# Patient Record
Sex: FEMALE | Race: WHITE | Employment: OTHER | URBAN - METROPOLITAN AREA
[De-identification: names, ages, dates, MRNs, and addresses within clinical notes are randomized per-mention and may not be internally consistent; named-entity substitution may affect disease eponyms.]

---

## 2024-02-18 ENCOUNTER — APPOINTMENT (OUTPATIENT)
Dept: GENERAL RADIOLOGY | Age: 89
End: 2024-02-18
Attending: PHYSICIAN ASSISTANT
Payer: MEDICARE

## 2024-02-18 ENCOUNTER — HOSPITAL ENCOUNTER (OUTPATIENT)
Age: 89
Discharge: HOME | End: 2024-02-18
Payer: MEDICARE

## 2024-02-18 VITALS
RESPIRATION RATE: 18 BRPM | TEMPERATURE: 100 F | HEART RATE: 77 BPM | DIASTOLIC BLOOD PRESSURE: 54 MMHG | SYSTOLIC BLOOD PRESSURE: 119 MMHG | OXYGEN SATURATION: 96 %

## 2024-02-18 DIAGNOSIS — S22.42XA CLOSED FRACTURE OF MULTIPLE RIBS OF LEFT SIDE, INITIAL ENCOUNTER: Primary | ICD-10-CM

## 2024-02-18 DIAGNOSIS — T14.90XA BLUNT TRAUMA: ICD-10-CM
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
## 2024-02-18 PROCEDURE — 71101 X-RAY EXAM UNILAT RIBS/CHEST: CPT | Performed by: PHYSICIAN ASSISTANT

## 2024-02-18 NOTE — ED PROVIDER NOTES
Patient Seen in: [facility]      History     Chief Complaint   Patient presents with    Fall     Stated Complaint: LT SIDE INJURY WITH PAIN    Subjective:   HPI    Patient is a 89-year-old female, presenting to immediate care for evaluation of acute left rib pain with associated bruising status post blunt trauma from mechanical fall 3 days ago.  Patient getting out of bed.  States tripped while walking of slippers and hit the corner of a desk onto left rib.  Since has been experiencing left rib pain with associated bruising.  Tender to palpation and movement.  Some relief with rest and ibuprofen.  She denies any other injuries.  No head injury or LOC.  No chest pain or shortness of breath.  No nausea or vomiting.  No back or flank or abdominal pain.  No hematuria.  No gait dysfunction.  Here for x-ray imaging for evaluation of rib fracture.  No other acute concerns    Objective:   History reviewed. No pertinent past medical history.           No pertinent past surgical history.              No pertinent social history.            Review of Systems   Constitutional:  Positive for activity change. Negative for fever.   Respiratory:  Negative for shortness of breath.    Cardiovascular:  Negative for chest pain.   Gastrointestinal:  Negative for abdominal pain, blood in stool, nausea and vomiting.   Genitourinary:  Negative for flank pain and hematuria.   Musculoskeletal:  Negative for back pain.        Left rib pain   Skin:         Left rib bruising   Psychiatric/Behavioral:  Negative for confusion.    All other systems reviewed and are negative.      Positive for stated complaint: LT SIDE INJURY WITH PAIN  Other systems are as noted in HPI.  Constitutional and vital signs reviewed.      All other systems reviewed and negative except as noted above.    Physical Exam     ED Triage Vitals [02/18/24 1320]   /54   Pulse 77   Resp 18   Temp 99.5 °F (37.5 °C)   Temp src    SpO2 96 %   O2 Device         Current:/54   Pulse 77   Temp 99.5 °F (37.5 °C)   Resp 18   SpO2 96%         Physical Exam  Vitals and nursing note reviewed.   Constitutional:       General: She is not in acute distress.     Appearance: Normal appearance. She is not ill-appearing, toxic-appearing or diaphoretic.   HENT:      Head: Normocephalic and atraumatic.   Eyes:      Conjunctiva/sclera: Conjunctivae normal.   Cardiovascular:      Rate and Rhythm: Normal rate.   Pulmonary:      Effort: Pulmonary effort is normal. No respiratory distress.      Breath sounds: Normal breath sounds.   Chest:          Comments: Large area of ecchymosis.  Tenderness to palpation on left lateral ribs.  No crepitus.  No hematoma.  Abdominal:      Palpations: Abdomen is soft.      Tenderness: There is no abdominal tenderness. There is no guarding.      Comments: Soft nontender   Musculoskeletal:         General: Normal range of motion.   Neurological:      Mental Status: She is alert and oriented to person, place, and time.   Psychiatric:         Mood and Affect: Mood normal.         Behavior: Behavior normal.             ED Course   Labs Reviewed - No data to display  XR RIBS WITH CHEST (3 VIEWS), LEFT  (CPT=71101)   Final Result   PROCEDURE: XR RIBS WITH CHEST (3 VIEWS), LEFT (CPT=71101)       COMPARISON: None.       INDICATIONS: Pain to left middle and lower ribs. Fall injury 4 days ago.       TECHNIQUE:   Four views.         FINDINGS:        BONES: A displaced left 8th rib fracture, and a nondisplaced left 9th and    10th rib fracture.       OTHER: Small left pleural effusion with left basilar atelectasis.  No    pneumothorax.  Multiple calcified bilateral pulmonary granulomas.     Atherosclerotic calcification aorta.  Heart size normal.                   =====   CONCLUSION:    1. Acute displaced left 8th rib fracture, and nondisplaced left 9th and    10th rib fractures.  Small left basilar pleural effusion with atelectasis.   2. Prior  granulomatous disease in the chest.               Dictated by (CST): Xavier Condon MD on 2/18/2024 at 2:07 PM        Finalized by (CST): Xavier Condon MD on 2/18/2024 at 2:09 PM                          MDM     Patient is a 89-year-old female, presenting to immediate care for evaluation of acute left rib pain status post blunt trauma 3 days ago.  Tenderness and bruising left lateral ribs.  X-ray imaging left ribs notable for acute displaced fracture left eighth, and nondisplaced left ninth and 10th rib fracture.  Small left pleural basilar effusion with atelectasis.  Granulomatosis disease chest present.  Patient's pain is well-controlled.  She is hemodynamically stable without hypoxia.  No chest pain or shortness of breath.  No abdominal pain.  No hematoma on exam.  Will treat for multiple close left rib fracture.  Pain management.  Tylenol and lidocaine patch as needed for pain.  Incentive spirometry for pulmonary pulmonary treatment and avoidance of pneumonia.  Close PCP follow-up.  ED return precautions including persistent/worsening symptoms or development of shortness of breath or fevers, etc.  Discharge instruction on rib fracture provided.        Medical Decision Making      Disposition and Plan     Clinical Impression:  1. Closed fracture of multiple ribs of left side, initial encounter    2. Fall, sequela    3. Blunt trauma         Disposition:  Discharge  2/18/2024  2:26 pm    Follow-up:  No follow-up provider specified.        Medications Prescribed:  There are no discharge medications for this patient.

## 2024-02-18 NOTE — ED INITIAL ASSESSMENT (HPI)
Pt here with left rib pain , pt state she slipped on her slippers 3 days ago and hit her left rib area on the corner of desk and fell, pt states she has pain and bruising to her left rib area , pt denies any head injury